# Patient Record
Sex: MALE | Race: WHITE | NOT HISPANIC OR LATINO | Employment: FULL TIME | URBAN - METROPOLITAN AREA
[De-identification: names, ages, dates, MRNs, and addresses within clinical notes are randomized per-mention and may not be internally consistent; named-entity substitution may affect disease eponyms.]

---

## 2018-09-28 ENCOUNTER — APPOINTMENT (EMERGENCY)
Dept: RADIOLOGY | Facility: HOSPITAL | Age: 33
End: 2018-09-28
Payer: OTHER GOVERNMENT

## 2018-09-28 ENCOUNTER — HOSPITAL ENCOUNTER (EMERGENCY)
Facility: HOSPITAL | Age: 33
Discharge: HOME | End: 2018-09-28
Attending: EMERGENCY MEDICINE | Admitting: EMERGENCY MEDICINE
Payer: OTHER GOVERNMENT

## 2018-09-28 ENCOUNTER — APPOINTMENT (EMERGENCY)
Dept: CT IMAGING | Facility: HOSPITAL | Age: 33
End: 2018-09-28
Payer: OTHER GOVERNMENT

## 2018-09-28 VITALS
HEART RATE: 87 BPM | BODY MASS INDEX: 25.03 KG/M2 | SYSTOLIC BLOOD PRESSURE: 107 MMHG | DIASTOLIC BLOOD PRESSURE: 60 MMHG | HEIGHT: 74 IN | RESPIRATION RATE: 18 BRPM | TEMPERATURE: 98.6 F | OXYGEN SATURATION: 99 % | WEIGHT: 195 LBS

## 2018-09-28 DIAGNOSIS — M54.6 MIDLINE THORACIC BACK PAIN: ICD-10-CM

## 2018-09-28 DIAGNOSIS — R22.0 MANDIBULAR SWELLING: Primary | ICD-10-CM

## 2018-09-28 LAB
ANION GAP SERPL CALCULATED.3IONS-SCNC: 7 MMOL/L (ref 4–13)
BASOPHILS # BLD AUTO: 0.02 THOUSANDS/ΜL (ref 0–0.1)
BASOPHILS NFR BLD AUTO: 0 % (ref 0–1)
BUN SERPL-MCNC: 9 MG/DL (ref 5–25)
CALCIUM SERPL-MCNC: 9.2 MG/DL (ref 8.3–10.1)
CHLORIDE SERPL-SCNC: 103 MMOL/L (ref 100–108)
CO2 SERPL-SCNC: 30 MMOL/L (ref 21–32)
CREAT SERPL-MCNC: 0.91 MG/DL (ref 0.6–1.3)
EOSINOPHIL # BLD AUTO: 0.22 THOUSAND/ΜL (ref 0–0.61)
EOSINOPHIL NFR BLD AUTO: 4 % (ref 0–6)
ERYTHROCYTE [DISTWIDTH] IN BLOOD BY AUTOMATED COUNT: 13 % (ref 11.6–15.1)
GFR SERPL CREATININE-BSD FRML MDRD: 109 ML/MIN/1.73SQ M
GLUCOSE SERPL-MCNC: 94 MG/DL (ref 65–140)
HCT VFR BLD AUTO: 45 % (ref 36.5–49.3)
HGB BLD-MCNC: 14.9 G/DL (ref 12–17)
IMM GRANULOCYTES # BLD AUTO: 0.02 THOUSAND/UL (ref 0–0.2)
IMM GRANULOCYTES NFR BLD AUTO: 0 % (ref 0–2)
LYMPHOCYTES # BLD AUTO: 1.33 THOUSANDS/ΜL (ref 0.6–4.47)
LYMPHOCYTES NFR BLD AUTO: 24 % (ref 14–44)
MCH RBC QN AUTO: 29.6 PG (ref 26.8–34.3)
MCHC RBC AUTO-ENTMCNC: 33.1 G/DL (ref 31.4–37.4)
MCV RBC AUTO: 89 FL (ref 82–98)
MONOCYTES # BLD AUTO: 0.31 THOUSAND/ΜL (ref 0.17–1.22)
MONOCYTES NFR BLD AUTO: 6 % (ref 4–12)
NEUTROPHILS # BLD AUTO: 3.61 THOUSANDS/ΜL (ref 1.85–7.62)
NEUTS SEG NFR BLD AUTO: 66 % (ref 43–75)
NRBC BLD AUTO-RTO: 0 /100 WBCS
PLATELET # BLD AUTO: 102 THOUSANDS/UL (ref 149–390)
PMV BLD AUTO: 11.3 FL (ref 8.9–12.7)
POTASSIUM SERPL-SCNC: 3.7 MMOL/L (ref 3.5–5.3)
RBC # BLD AUTO: 5.04 MILLION/UL (ref 3.88–5.62)
SODIUM SERPL-SCNC: 140 MMOL/L (ref 136–145)
WBC # BLD AUTO: 5.51 THOUSAND/UL (ref 4.31–10.16)

## 2018-09-28 PROCEDURE — 85025 COMPLETE CBC W/AUTO DIFF WBC: CPT | Performed by: PHYSICIAN ASSISTANT

## 2018-09-28 PROCEDURE — 99284 EMERGENCY DEPT VISIT MOD MDM: CPT

## 2018-09-28 PROCEDURE — 72080 X-RAY EXAM THORACOLMB 2/> VW: CPT

## 2018-09-28 PROCEDURE — 70487 CT MAXILLOFACIAL W/DYE: CPT

## 2018-09-28 PROCEDURE — 80048 BASIC METABOLIC PNL TOTAL CA: CPT | Performed by: PHYSICIAN ASSISTANT

## 2018-09-28 PROCEDURE — 96374 THER/PROPH/DIAG INJ IV PUSH: CPT

## 2018-09-28 PROCEDURE — 36415 COLL VENOUS BLD VENIPUNCTURE: CPT | Performed by: PHYSICIAN ASSISTANT

## 2018-09-28 RX ORDER — KETOROLAC TROMETHAMINE 30 MG/ML
30 INJECTION, SOLUTION INTRAMUSCULAR; INTRAVENOUS ONCE
Status: COMPLETED | OUTPATIENT
Start: 2018-09-28 | End: 2018-09-28

## 2018-09-28 RX ORDER — PENICILLIN V POTASSIUM 500 MG/1
500 TABLET ORAL 4 TIMES DAILY
Qty: 40 TABLET | Refills: 0 | Status: SHIPPED | OUTPATIENT
Start: 2018-09-28 | End: 2018-10-05

## 2018-09-28 RX ORDER — CLOVE OIL
OIL (ML) MISCELLANEOUS AS NEEDED
Qty: 3.5 ML | Refills: 0 | Status: SHIPPED | OUTPATIENT
Start: 2018-09-28

## 2018-09-28 RX ORDER — CYCLOBENZAPRINE HCL 5 MG
5 TABLET ORAL 3 TIMES DAILY PRN
Qty: 30 TABLET | Refills: 0 | Status: SHIPPED | OUTPATIENT
Start: 2018-09-28

## 2018-09-28 RX ADMIN — KETOROLAC TROMETHAMINE 30 MG: 30 INJECTION, SOLUTION INTRAMUSCULAR at 19:37

## 2018-09-28 RX ADMIN — IOHEXOL 85 ML: 350 INJECTION, SOLUTION INTRAVENOUS at 20:58

## 2018-09-28 NOTE — ED PROVIDER NOTES
History  Chief Complaint   Patient presents with    Jaw Swelling     pt states right side of jaw is swollen, noticed this morning     Back Pain     pt c/o lower back pain that began this morning      Carmelita Tavarez is a 26 y o  male w PMH none significant who presents for evaluation of jaw swelling  Patient has c/o L jaw swelling and mid back pain  He reports he fell out of his truck yesterday  He had lost his footing and tried to steady himself and regain his balance but ended up falling a few feet to the ground where he landed on his mid-back and he hit the side of the face against the truck in the process  Denies saddle anesthesia and bladder or bowel incontinence  No extremity weakness and numbness  None       History reviewed  No pertinent past medical history  Past Surgical History:   Procedure Laterality Date    SPINAL FUSION         History reviewed  No pertinent family history  I have reviewed and agree with the history as documented  Social History   Substance Use Topics    Smoking status: Current Every Day Smoker    Smokeless tobacco: Not on file    Alcohol use No        Review of Systems   Constitutional: Negative for activity change, chills, diaphoresis, fatigue and fever  HENT: Negative for congestion and rhinorrhea  Eyes: Negative for pain  Respiratory: Negative for cough, chest tightness, shortness of breath and wheezing  Cardiovascular: Negative for chest pain and palpitations  Gastrointestinal: Negative for abdominal distention, constipation, diarrhea, nausea and vomiting  Genitourinary: Negative for difficulty urinating and dysuria  Musculoskeletal: Negative for arthralgias and myalgias  Neurological: Negative for dizziness, weakness, light-headedness and headaches  Psychiatric/Behavioral: The patient is not nervous/anxious  Physical Exam  Physical Exam   Constitutional: He is oriented to person, place, and time   He appears well-developed and well-nourished  No distress  HENT:   Head: Normocephalic and atraumatic  No external sx head trauma other than some R mandibular swelling but there is no ecchymosis or instability of bone  He does have poor dentition w multiple dental caries   No visible abscess  Posterior oropharynx normal   No llamas / racoon sx   No hemotympanum, otorrhea or rhinorrhea    Eyes: Pupils are equal, round, and reactive to light  Conjunctivae and EOM are normal    Atraumatic orbits   Neck: Neck supple  No tracheal deviation present  c spine w/o midline stepoff or deformity or ttp   Cardiovascular: Normal rate, regular rhythm, normal heart sounds and intact distal pulses  Exam reveals no gallop and no friction rub  No murmur heard  Pulmonary/Chest: Effort normal and breath sounds normal  No respiratory distress  He has no wheezes  He has no rales  He exhibits no tenderness  BS equal and cta b/l    Abdominal: Soft  Bowel sounds are normal  He exhibits no distension and no mass  There is no tenderness  There is no rebound and no guarding  Musculoskeletal: Normal range of motion  He exhibits no edema, tenderness or deformity  Pelvis stable   No midline spinal stepoff or deformity   Has midline ttp of thoracic spine but reports chronically has some pain here and there are midline scars across thoracic spine    Neurological: He is alert and oriented to person, place, and time  GCS 15    Skin: Skin is warm and dry  He is not diaphoretic  Psychiatric: He has a normal mood and affect  His behavior is normal    Nursing note and vitals reviewed        Vital Signs  ED Triage Vitals   Temperature Pulse Respirations Blood Pressure SpO2   09/28/18 1834 09/28/18 1834 09/28/18 1834 09/28/18 1834 09/28/18 1834   98 6 °F (37 °C) 97 18 139/81 99 %      Temp Source Heart Rate Source Patient Position - Orthostatic VS BP Location FiO2 (%)   09/28/18 1834 09/28/18 1834 09/28/18 1834 09/28/18 1834 --   Oral Monitor Sitting Left arm Pain Score       09/28/18 2159       5           Vitals:    09/28/18 1834 09/28/18 1929 09/28/18 2231   BP: 139/81 133/82 107/60   Pulse: 97 92 87   Patient Position - Orthostatic VS: Sitting Lying        Visual Acuity      ED Medications  Medications   ketorolac (TORADOL) injection 30 mg (30 mg Intravenous Given 9/28/18 1937)   iohexol (OMNIPAQUE) 350 MG/ML injection (MULTI-DOSE) 85 mL (85 mL Intravenous Given 9/28/18 2058)       Diagnostic Studies  Results Reviewed     Procedure Component Value Units Date/Time    Basic metabolic panel [[id number]] Collected:  09/28/18 1936    Lab Status:  Final result Specimen:  Blood from Arm, Right Updated:  09/28/18 1957     Sodium 140 mmol/L      Potassium 3 7 mmol/L      Chloride 103 mmol/L      CO2 30 mmol/L      ANION GAP 7 mmol/L      BUN 9 mg/dL      Creatinine 0 91 mg/dL      Glucose 94 mg/dL      Calcium 9 2 mg/dL      eGFR 109 ml/min/1 73sq m     Narrative:         National Kidney Disease Education Program recommendations are as follows:  GFR calculation is accurate only with a steady state creatinine  Chronic Kidney disease less than 60 ml/min/1 73 sq  meters  Kidney failure less than 15 ml/min/1 73 sq  meters      CBC and differential [[id number]]  (Abnormal) Collected:  09/28/18 1936    Lab Status:  Final result Specimen:  Blood from Arm, Right Updated:  09/28/18 1944     WBC 5 51 Thousand/uL      RBC 5 04 Million/uL      Hemoglobin 14 9 g/dL      Hematocrit 45 0 %      MCV 89 fL      MCH 29 6 pg      MCHC 33 1 g/dL      RDW 13 0 %      MPV 11 3 fL      Platelets 382 (L) Thousands/uL      nRBC 0 /100 WBCs      Neutrophils Relative 66 %      Immat GRANS % 0 %      Lymphocytes Relative 24 %      Monocytes Relative 6 %      Eosinophils Relative 4 %      Basophils Relative 0 %      Neutrophils Absolute 3 61 Thousands/µL      Immature Grans Absolute 0 02 Thousand/uL      Lymphocytes Absolute 1 33 Thousands/µL      Monocytes Absolute 0 31 Thousand/µL      Eosinophils Absolute 0 22 Thousand/µL      Basophils Absolute 0 02 Thousands/µL                  CT facial bones with contrast   Final Result by Korin Montes MD (09/28 2134)      1  No acute facial fracture  2   Soft tissue swelling overlying the right mandible likely odontogenic in origin  No significant fluid collection to suggest drainable abscess  3   Multiple dental caries and periapical lucencies compatible with severe periodontal disease many of which demonstrate cortical breakthrough as described above  Workstation performed: [id number]         XR spine thoracolumbar 2 vw   Final Result by Basilia Zapata MD (09/28 2055)      Posterior thoracolumbar fixation hardware extending from T11 through L2 is intact without apparent complication  Moderate, likely chronic given the surrounding hardware, compression deformity of T12  No acute fracture  Workstation performed: [id number]                    Procedures  Procedures       Phone Contacts  ED Phone Contact    ED Course  ED Course as of Sep 28 2232   Fri Sep 28, 2018   2153 Back pain improved w toradol  Discussed CT findings w him and 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, he does not follow w this surgeon actively  Has recently moved to area to will refer locally for follow up as / if needed for persistent pain                                   MDM  Number of Diagnoses or Management Options  Mandibular swelling:   Midline thoracic back pain:   Diagnosis management comments: DDX includes but not ltd to:   Patient w chronic back pain however had new injury yesterday  No abd or flank pain to suggest solid visceral organ injury but does have midline back pain   Consider this is fracture, sprain   Unlikely to be epidural hematoma   Not consistent cauda equina   Regarding mandibular swelling think this is more related to poor dentition and consider developing abscess but alternatively could be mandibular fracture    Plan is to obtain:  CT facial bones for fracture, abscess   CBC to check for anemia, leukocytosis, hydration status  Chemistry panel to check for lyte abnormalities, organ function   XR of affected joint(s) for acute osseous abnormality     Based on results:  No acute abnormalities on imaging  Possible jaw pain related to poor dentition - advised follow up w dentist  He recently moved to area so referred him to local clinics  He can follow w nsgx as needed given compression deformity of T12 that is likely chronic but cant exclude acute injury as he is tender here and cant recall his specific injury previously  Return parameters discussed  Pt requires f/u as an outpt  Pt expresses understanding w above treatment plan  All questions answered prior to d/c  Portions of the record may have been created with voice recognition software   Occasional wrong word or "sound a like" substitutions may have occurred due to the inherent limitations of voice recognition software   Read the chart carefully and recognize, using context, where substitutions have occurred  CritCare Time    Disposition  Final diagnoses:   Mandibular swelling   Midline thoracic back pain     Time reflects when diagnosis was documented in both MDM as applicable and the Disposition within this note     Time User Action Codes Description Comment    9/28/2018 10:19 PM Leticia Camacho Add [R22 0] Mandibular swelling     9/28/2018 10:19 PM Leticia Rochekrat Add [M54 6] Midline thoracic back pain       ED Disposition     ED Disposition Condition Comment    Discharge  Anny Calle discharge to home/self care      Condition at discharge: Good        Follow-up Information     Follow up With Specialties Details Why Contact Info Additional [address] [facility] Emergency Department Emergency Medicine  If symptoms worsen [facility] [address]  [id number]  [address] [place] [address] ED, [address] [place], [place], [address]  Call in 1 day  [facility] Desiree Carrillo MD Neurosurgery Call in 1 day  [place] [address]  [phone]             Patient's Medications   Discharge Prescriptions    CLOVE OIL    Apply topically as needed for mucositis       Start Date: 9/28/2018 End Date: --       Order Dose: --       Quantity: 3 5 mL    Refills: 0    CYCLOBENZAPRINE (FLEXERIL) 5 MG TABLET    Take 1 tablet (5 mg total) by mouth 3 (three) times a day as needed for muscle spasms for up to 30 doses       Start Date: 9/28/2018 End Date: --       Order Dose: 5 mg       Quantity: 30 tablet    Refills: 0    PENICILLIN V POTASSIUM (VEETID) 500 MG TABLET    Take 1 tablet (500 mg total) by mouth 4 (four) times a day for 7 days       Start Date: 9/28/2018 End Date: 10/5/2018       Order Dose: 500 mg       Quantity: 40 tablet    Refills: 0     No discharge procedures on file      ED Provider  Electronically Signed by           Juaniat Mireles PA-C  09/28/18 7264

## 2018-09-29 NOTE — DISCHARGE INSTRUCTIONS
Back Pain, Ambulatory Care   GENERAL INFORMATION:   Back pain  is common  You may feel sore or stiff on one or both sides of your back  The pain may spread to your buttocks or thighs  Back pain may be caused by an injury, lack of exercise, or obesity  Repeated bending, lifting, twisting, or lifting heavy items can also cause back pain  Seek immediate care for the following symptoms:   · Pain, numbness, or weakness in one or both legs    · Pain that is so severe, you cannot walk    · Unable to control your urine or bowel movements    · Severe back pain with chest pain    · Severe back pain, nausea, and vomiting    · Severe back pain that spreads to your side or genital area  Treatment for back pain  may include any of the following:  · NSAIDs  help decrease swelling and pain or fever  This medicine is available with or without a doctor's order  NSAIDs can cause stomach bleeding or kidney problems in certain people  If you take blood thinner medicine, always ask your healthcare provider if NSAIDs are safe for you  Always read the medicine label and follow directions  · Acetaminophen  decreases pain  It is available without a doctor's order  Ask how much to take and how often to take it  Follow directions  Acetaminophen can cause liver damage if not taken correctly  · Prescription pain medicine  may be given  Ask your healthcare provider how to take this medicine safely  Manage your back pain:   · Apply ice  on your back for 15 to 20 minutes every hour or as directed  Use an ice pack, or put crushed ice in a plastic bag  Cover it with a towel  Ice helps decrease swelling and pain  · Apply heat  on your back for 20 to 30 minutes every 2 hours for as many days as directed  Heat helps decrease pain and muscle spasms  You can alternate ice and heat  · Stay active  as much as you can without causing more pain  Bed rest could make your back pain worse  Avoid heavy lifting until your pain is gone    Follow up with your healthcare provider as directed:  Write down your questions so you remember to ask them during your visits  CARE AGREEMENT:   You have the right to help plan your care  Learn about your health condition and how it may be treated  Discuss treatment options with your caregivers to decide what care you want to receive  You always have the right to refuse treatment  The above information is an  only  It is not intended as medical advice for individual conditions or treatments  Talk to your doctor, nurse or pharmacist before following any medical regimen to see if it is safe and effective for you  © 2014 [address] is for End User's use only and may not be sold, redistributed or otherwise used for commercial purposes  All illustrations and images included in CareNotes® are the copyrighted property of [employer]  or Niko Tafoya